# Patient Record
Sex: MALE | URBAN - METROPOLITAN AREA
[De-identification: names, ages, dates, MRNs, and addresses within clinical notes are randomized per-mention and may not be internally consistent; named-entity substitution may affect disease eponyms.]

---

## 2022-12-28 DIAGNOSIS — Z12.11 SCREENING FOR COLON CANCER: Primary | ICD-10-CM

## 2023-05-31 ENCOUNTER — TELEPHONE (OUTPATIENT)
Dept: GASTROENTEROLOGY | Facility: CLINIC | Age: 48
End: 2023-05-31
Payer: COMMERCIAL

## 2023-05-31 VITALS — HEIGHT: 72 IN | WEIGHT: 220 LBS | BODY MASS INDEX: 29.8 KG/M2

## 2023-05-31 DIAGNOSIS — Z12.11 SCREENING FOR COLON CANCER: Primary | ICD-10-CM

## 2023-05-31 RX ORDER — LISINOPRIL 20 MG/1
TABLET ORAL
COMMUNITY
Start: 2023-05-22

## 2023-05-31 NOTE — TELEPHONE ENCOUNTER
Reached out to pt and scheduled screening colon w/ RMM per referral. Updated chart in Epic w/ pt. Reviewed instructions w/ pt who voiced understanding. E-mailed instructions and link to Sutab to [email or web address] per pt request - VL

## 2023-06-01 RX ORDER — SOD SULF/POT CHLORIDE/MAG SULF 1.479 G
12 TABLET ORAL DAILY
Qty: 24 TABLET | Refills: 0 | Status: SHIPPED | OUTPATIENT
Start: 2023-06-01 | End: 2023-07-27 | Stop reason: ALTCHOICE

## 2023-07-10 DIAGNOSIS — Z12.11 SCREENING FOR COLON CANCER: Primary | ICD-10-CM

## 2023-07-10 NOTE — PROGRESS NOTES
[facility]  [address] [place] [address]  Phone: [phone]  Fax: [phone]    History & Physical         Provider: Dr. Phil Sharp    Patient Name: Dangelo MONTANEZ (age):1975  48 y.o.           Gender: male   Phone: [phone]     Referring Physician: Maira Ying     Vital Signs:   Height - 6'  Weight - 220 lbs  BMI -  29.8    Plan: Colonoscopy    Encounter Diagnosis   Name Primary?    Screening for colon cancer Yes           History:      Past Medical History:   Diagnosis Date    BMI 29.0-29.9,adult     Chronic sinusitis, unspecified     Environmental allergies     High blood pressure       Past Surgical History:   Procedure Laterality Date    APPENDECTOMY        Medication List with Changes/Refills   Current Medications    LISINOPRIL (PRINIVIL,ZESTRIL) 20 MG TABLET    TAKE 1 TABLET BY MOUTH DAILY FOR BLOOD PRESSURE thank youmike -)    SOD SULF-POT CHLORIDE-MAG SULF (SUTAB) 1.479-0.188- 0.225 GRAM TABLET    Take 12 tablets by mouth once daily. Take according to package instructions with indicated amount of water. No breakfast day before test. May substitute with Suprep, Clenpiq, Plenvu, Moviprep or GoLytely based on Rx plan and patient preference.      Review of patient's allergies indicates:   Allergen Reactions    Penicillins       Family History   Problem Relation Age of Onset    Lung cancer Father 60    Ulcerative colitis Neg Hx     Pancreatic cancer Neg Hx     Liver disease Neg Hx     Colon cancer Neg Hx     Throat cancer Neg Hx     Esophageal cancer Neg Hx     Crohn's disease Neg Hx     Stomach cancer Neg Hx       Social History     Tobacco Use    Smoking status: Former     Types: Cigarettes    Smokeless tobacco: Former     Types: Snuff   Substance Use Topics    Alcohol use: Never    Drug use: Never        Physical Examination:     General  Appearance:___________________________  HEENT: _____________________________________  Abdomen:____________________________________  Heart:________________________________________  Lungs:_______________________________________  Extremities:___________________________________  Skin:_________________________________________  Endocrine:____________________________________  Genitourinary:_________________________________  Neurological:__________________________________      Patient has been evaluated immediately prior to sedation and is medically cleared for endoscopy with IVCS as an ASA class: ______      Physician Signature: _________________________       Date: ________  Time: ________

## 2023-07-17 ENCOUNTER — TELEPHONE (OUTPATIENT)
Dept: GASTROENTEROLOGY | Facility: CLINIC | Age: 48
End: 2023-07-17
Payer: COMMERCIAL

## 2023-07-17 NOTE — TELEPHONE ENCOUNTER
----- Message from Afia Corrigan sent at 7/17/2023  8:39 AM CDT -----  Contact: Gali/wife  Gali is calling to see if he can get another email with the prescription discount card on it the last one he got he deleted by mistake.    Thanks  Td

## 2023-07-21 ENCOUNTER — OUTSIDE PLACE OF SERVICE (OUTPATIENT)
Dept: GASTROENTEROLOGY | Facility: CLINIC | Age: 48
End: 2023-07-21

## 2023-07-21 LAB — CRC RECOMMENDATION EXT: NORMAL

## 2023-07-21 PROCEDURE — 45385 PR COLONOSCOPY,REMV LESN,SNARE: ICD-10-PCS | Mod: 33,,, | Performed by: INTERNAL MEDICINE

## 2023-07-21 PROCEDURE — 45385 COLONOSCOPY W/LESION REMOVAL: CPT | Mod: 33,,, | Performed by: INTERNAL MEDICINE

## 2023-07-27 ENCOUNTER — TELEPHONE (OUTPATIENT)
Dept: GASTROENTEROLOGY | Facility: CLINIC | Age: 48
End: 2023-07-27

## 2023-07-27 DIAGNOSIS — K62.1 RECTAL POLYP: ICD-10-CM

## 2023-07-27 DIAGNOSIS — D3A.8 NEUROENDOCRINE TUMOR: Primary | ICD-10-CM

## 2023-07-27 NOTE — TELEPHONE ENCOUNTER
----- Message from Subha Nagy sent at 7/27/2023  9:38 AM CDT -----  Contact: willie  [phone]  Type:  Patient Returning Call    Who Called:willie  Who Left Message for Patient:sarabjit  Does the patient know what this is regarding?:appt  Would the patient rather a call back or a response via [facility]? Call back  Best Call Back Number:[phone]    Additional Information: n/a

## 2023-07-27 NOTE — TELEPHONE ENCOUNTER
Wife called and said Dr Sharp tried to call her but she could not talk because she was at work.  She said her  did not understand what you told him and he would like you to talk to her.  He is at work and he can't talk to you.  Please call her at [phone]

## 2023-07-27 NOTE — TELEPHONE ENCOUNTER
results given to patient-set up FLEX SIG next week to tattoo rectal area-prep-clears day before-ducolax 4 po at 4 pm, fleets enema at 8pm and in am one hour prior to procedure      - Spoke to patient and scheduled procedure. Will email prep to patiens wife. -KG

## 2023-07-31 DIAGNOSIS — D3A.8 NEUROENDOCRINE TUMOR: Primary | ICD-10-CM

## 2023-07-31 DIAGNOSIS — K62.1 RECTAL POLYP: ICD-10-CM

## 2023-07-31 NOTE — PROGRESS NOTES
[facility]  [address] [place] [address]  Phone: [phone]  Fax: [phone]    History & Physical         Provider: Dr. Phil Sharp    Patient Name: Dangelo MONTANEZ (age):1975  48 y.o.           Gender: male   Phone: [phone]     Referring Physician: Maira Ying     Vital Signs:   Height - 6'  Weight - 220 lbs  BMI -  29.8    Plan: Flexible Sigmoidoscopy w/ tattoo    Encounter Diagnoses   Name Primary?    Neuroendocrine tumor Yes    Rectal polyp            History:      Past Medical History:   Diagnosis Date    BMI 29.0-29.9,adult     Chronic sinusitis, unspecified     Environmental allergies     High blood pressure     Neuroendocrine tumor     rectal polyp    Personal history of colonic polyps       Past Surgical History:   Procedure Laterality Date    APPENDECTOMY      COLONOSCOPY  2023    rectal polyp - neuroendocrine tumor - G1      Medication List with Changes/Refills   Current Medications    LISINOPRIL (PRINIVIL,ZESTRIL) 20 MG TABLET    TAKE 1 TABLET BY MOUTH DAILY FOR BLOOD PRESSURE thank youmike -)      Review of patient's allergies indicates:   Allergen Reactions    Penicillins       Family History   Problem Relation Age of Onset    Lung cancer Father 60    Ulcerative colitis Neg Hx     Pancreatic cancer Neg Hx     Liver disease Neg Hx     Colon cancer Neg Hx     Throat cancer Neg Hx     Esophageal cancer Neg Hx     Crohn's disease Neg Hx     Stomach cancer Neg Hx       Social History     Tobacco Use    Smoking status: Former     Current packs/day: 0.00     Types: Cigarettes    Smokeless tobacco: Former     Types: Snuff   Substance Use Topics    Alcohol use: Never    Drug use: Never        Physical Examination:     General Appearance:___________________________  HEENT:  _____________________________________  Abdomen:____________________________________  Heart:________________________________________  Lungs:_______________________________________  Extremities:___________________________________  Skin:_________________________________________  Endocrine:____________________________________  Genitourinary:_________________________________  Neurological:__________________________________      Patient has been evaluated immediately prior to sedation and is medically cleared for endoscopy with IVCS as an ASA class: ______      Physician Signature: _________________________       Date: ________  Time: ________

## 2023-08-01 ENCOUNTER — TELEPHONE (OUTPATIENT)
Dept: GASTROENTEROLOGY | Facility: CLINIC | Age: 48
End: 2023-08-01
Payer: COMMERCIAL

## 2023-08-01 NOTE — TELEPHONE ENCOUNTER
----- Message from Anny Lu sent at 8/1/2023  8:55 AM CDT -----  Type:  Needs Medical Advice    Who Called: Dangelo Fernando ( pt's wife, Gali )   Symptoms (please be specific): -   How long has patient had these symptoms:  -  Pharmacy name and phone #:  -  Would the patient rather a call back or a response via [facility]?    Best Call Back Number: [phone] ([facility] ) [phone] ( home)   Additional Information: needs to clarify if pt can have solid food today please call

## 2023-08-01 NOTE — TELEPHONE ENCOUNTER
Spoke to patients wife and let her know he is NOT to have any solids and that his time is not 12 - [facility] will call the patient with arrival time. -KG

## 2023-08-02 ENCOUNTER — TELEPHONE (OUTPATIENT)
Dept: GASTROENTEROLOGY | Facility: CLINIC | Age: 48
End: 2023-08-02

## 2023-08-02 ENCOUNTER — OUTSIDE PLACE OF SERVICE (OUTPATIENT)
Dept: GASTROENTEROLOGY | Facility: CLINIC | Age: 48
End: 2023-08-02

## 2023-08-07 ENCOUNTER — TELEPHONE (OUTPATIENT)
Dept: GASTROENTEROLOGY | Facility: CLINIC | Age: 48
End: 2023-08-07
Payer: COMMERCIAL

## 2023-08-07 NOTE — TELEPHONE ENCOUNTER
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    Results discussed with patient per Dr. Sharp's chart remarks. Patient voices understanding/agreement. Referral was sent 8/2/23. -Katie JONES CMA

## 2023-08-09 ENCOUNTER — TELEPHONE (OUTPATIENT)
Dept: GASTROENTEROLOGY | Facility: CLINIC | Age: 48
End: 2023-08-09
Payer: COMMERCIAL

## 2023-08-09 NOTE — TELEPHONE ENCOUNTER
Spoke to both MALENA and pt. Patient is in system and should get a call either this afternoon or tomorrow to get him scheduled w/ the GI center. GI's number is [phone] opt 1. Fax for any clinicals: [phone]. MALENA MRN: [id number] -- add to facesheet for all clinicals sent. -KG

## 2023-08-09 NOTE — TELEPHONE ENCOUNTER
----- Message from Maryam Dixon sent at 8/9/2023 12:04 PM CDT -----  Patient wife is calling in regards to have not receive a call from MD Guerra..Please call them back at [phone].              Thanks  melody

## 2023-08-11 ENCOUNTER — DOCUMENTATION ONLY (OUTPATIENT)
Dept: GASTROENTEROLOGY | Facility: CLINIC | Age: 48
End: 2023-08-11
Payer: COMMERCIAL

## 2023-08-17 ENCOUNTER — TELEPHONE (OUTPATIENT)
Dept: GASTROENTEROLOGY | Facility: CLINIC | Age: 48
End: 2023-08-17
Payer: COMMERCIAL

## 2023-08-17 NOTE — TELEPHONE ENCOUNTER
----- Message from Karla Mehta sent at 8/17/2023  9:06 AM CDT -----  Contact: Meryl Guerra  Type: Staff Message    Who called: Xantie/MD Anderson  Call back number:  [phone] ext [phone]  Reason for the call: Checking on receipt of continuation of care faxed 8/16/2023  Additional information: Request ID number [id number]

## 2024-03-07 DIAGNOSIS — Z12.11 SCREENING FOR COLON CANCER: Primary | ICD-10-CM

## 2024-04-02 ENCOUNTER — TELEPHONE (OUTPATIENT)
Dept: GASTROENTEROLOGY | Facility: CLINIC | Age: 49
End: 2024-04-02
Payer: COMMERCIAL

## 2024-04-02 NOTE — TELEPHONE ENCOUNTER
Called patient to schedule colonoscopy. He said that he is in [place] working and will have his wife call the office to get him scheduled. DMP

## 2024-04-02 NOTE — TELEPHONE ENCOUNTER
----- Message from Niecy Joseph sent at 4/2/2024  3:22 PM CDT -----  Regarding: Direct Schedule - New Pt NBP/Est Pt RMM - last sigmoidoscopy 08.2023  2nd referral    Please call pt to schedule.    Thanks,  Niecy

## 2024-04-22 ENCOUNTER — TELEPHONE (OUTPATIENT)
Dept: GASTROENTEROLOGY | Facility: CLINIC | Age: 49
End: 2024-04-22
Payer: COMMERCIAL

## 2024-04-22 NOTE — TELEPHONE ENCOUNTER
Called patient to schedule colonoscopy. He asked me to call his wife at [phone] since he is at work.  Called his wife and she didn't answer. Lvm telling her to call the office back. DMP

## 2024-04-22 NOTE — TELEPHONE ENCOUNTER
----- Message from Niecy Joseph sent at 4/22/2024  2:50 PM CDT -----  Regarding: Direct Schedule - New Pt NBP/Est Pt RMM - last sigmoidoscopy 08.2023  Contact: Niecy Fairchild  ----- Message -----  From: Dayami Flanagan MA  Sent: 4/22/2024   2:44 PM CDT  To: Niecy Joseph      ----- Message -----  From: Miriam Jean Baptiste  Sent: 4/22/2024   2:41 PM CDT  To: Juan LEAL Staff    Patient Call Back    Patient Name: Dangelo Fernando   Nature of Call:Appt Scheduling  Call Back Number: [phone]    Additional Information: referral authorized.

## 2024-04-29 ENCOUNTER — TELEPHONE (OUTPATIENT)
Dept: GASTROENTEROLOGY | Facility: CLINIC | Age: 49
End: 2024-04-29

## 2024-04-29 NOTE — TELEPHONE ENCOUNTER
Patient is not due for colonoscopy until 7/21/2028 per gmed. Patient has referral for colonoscopy in work que. Called patient to see if he had any issues and to let him know he is not currently due at this time. 4/29/24 SIRISHAA

## 2024-07-15 ENCOUNTER — TELEPHONE (OUTPATIENT)
Dept: GASTROENTEROLOGY | Facility: CLINIC | Age: 49
End: 2024-07-15
Payer: COMMERCIAL

## 2024-07-15 VITALS — WEIGHT: 220 LBS | HEIGHT: 71 IN | BODY MASS INDEX: 30.8 KG/M2

## 2024-07-15 DIAGNOSIS — Z86.010 HISTORY OF COLON POLYPS: ICD-10-CM

## 2024-07-15 DIAGNOSIS — D3A.8 BENIGN NEUROENDOCRINE NEOPLASM OF RECTUM: Primary | ICD-10-CM

## 2024-07-15 NOTE — TELEPHONE ENCOUNTER
----- Message from Deepika Valiente sent at 7/15/2024  9:19 AM CDT -----  Contact: pt  Pt wife calling for 1 year re-check for colonoscopy  and he can be reached at [phone]     Thanks,

## 2024-07-15 NOTE — TELEPHONE ENCOUNTER
Called patient. He advised he is at work and requested me to call his wife at [phone].     Patient's wife requested that patient be put on the cancellation list. Patient has been added.     Patient is not having any current GI problems or issues. No hx of seizures, sleep apnea, or kidney disease. Chart was reviewed and updated with patient's wife. Prep instructions were also reviewed and sent to patient's email at [email or web address].    Colonoscopy scheduled on Monday December 9, 2024 w/ NBP at [facility]. [facility]

## 2024-07-15 NOTE — TELEPHONE ENCOUNTER
Per MAGDALENA okay to direct schedule patient for colonoscopy. This is per MDA's recommendations. DMP

## 2024-07-19 RX ORDER — SOD SULF/POT CHLORIDE/MAG SULF 1.479 G
TABLET ORAL
Qty: 24 TABLET | Refills: 0 | Status: SHIPPED | OUTPATIENT
Start: 2024-07-19

## 2024-07-19 NOTE — TELEPHONE ENCOUNTER
Low grade-NET of rectal polyp with no residual tumor s/p polypectomy 7/2023  MDA- recommended colonoscopy in 1 year (8/2024).   Order signed.  KN

## 2024-12-02 ENCOUNTER — TELEPHONE (OUTPATIENT)
Dept: GASTROENTEROLOGY | Facility: CLINIC | Age: 49
End: 2024-12-02
Payer: COMMERCIAL

## 2024-12-02 VITALS — HEIGHT: 71 IN | WEIGHT: 220 LBS | BODY MASS INDEX: 30.8 KG/M2

## 2024-12-02 DIAGNOSIS — D3A.8 BENIGN NEUROENDOCRINE NEOPLASM OF RECTUM: Primary | ICD-10-CM

## 2024-12-02 DIAGNOSIS — Z86.0100 HISTORY OF COLON POLYPS: ICD-10-CM

## 2024-12-02 NOTE — TELEPHONE ENCOUNTER
S/w pt and told him that I was calling as a courtesy regarding up coming Colon with NBP on 12/9/24, Monday and wanted to verify that he has his paper prep instructions and meds. Pt stated he has both.  I also mentioned that [facility] will call the day before (FRI) with the arrival time, GI Lab is located on the third floor, and to pre-register anytime this week. aris

## 2024-12-02 NOTE — TELEPHONE ENCOUNTER
"[facility]  [address] [place] [address]  Phone: [phone]  Fax: [phone]    History & Physical         Provider: Dr. Monique Martinez    Patient Name: Dangelo MONTANEZ (age):1975  49 y.o.           Gender: male   Phone: [phone]     Referring Physician: Maira Ying     Vital Signs:   Height - 5' 11"  Weight - 220 lb  BMI -  30.68    Plan: Colonoscopy (h/o NET rectal polyp 2023) @ [facility]    Encounter Diagnoses   Name Primary?    Benign neuroendocrine neoplasm of rectum Yes    History of colon polyps            History:      Past Medical History:   Diagnosis Date    BMI 30.68 07/15/2024    Chronic sinusitis, unspecified     Environmental allergies     High blood pressure     Hx of grade/stage I internal hemorrhoids     Neuroendocrine tumor 2023    low grade-rectal polyp    Personal history of colonic polyps       Past Surgical History:   Procedure Laterality Date    APPENDECTOMY      COLONOSCOPY  2023    rectal polyp - neuroendocrine tumor - G1    SIGMOIDOSCOPY  2023      Medication List with Changes/Refills   Current Medications    LISINOPRIL (PRINIVIL,ZESTRIL) 20 MG TABLET    TAKE 1 TABLET BY MOUTH DAILY FOR BLOOD PRESSURE thank tommie -)    SOD SULF-POT CHLORIDE-MAG SULF (SUTAB) 1.479-0.188- 0.225 GRAM TABLET    Take according to package instructions with indicated amount of water. No breakfast day before test. May substitute with Suprep, Clenpiq, Plenvu, Moviprep or GoLytely based on Rx plan and patient preference.      Review of patient's allergies indicates:   Allergen Reactions    Penicillins       Family History   Problem Relation Name Age of Onset    No Known Problems Mother      Lung cancer Father  60    Ulcerative colitis Neg Hx      Pancreatic cancer Neg Hx      Liver disease Neg Hx      Colon cancer Neg Hx      Throat cancer Neg Hx      Esophageal " cancer Neg Hx      Crohn's disease Neg Hx      Stomach cancer Neg Hx        Social History     Tobacco Use    Smoking status: Former     Types: Cigarettes    Smokeless tobacco: Former     Types: Snuff   Substance Use Topics    Alcohol use: Never    Drug use: Never        Physical Examination:     General Appearance:___________________________  HEENT: _____________________________________  Abdomen:____________________________________  Heart:________________________________________  Lungs:_______________________________________  Extremities:___________________________________  Skin:_________________________________________  Endocrine:____________________________________  Genitourinary:_________________________________  Neurological:__________________________________      Patient has been evaluated immediately prior to sedation and is medically cleared for endoscopy with IVCS as an ASA class: ______      Physician Signature: _________________________       Date: ________  Time: ________

## 2024-12-06 NOTE — TELEPHONE ENCOUNTER
Deepika Valiente Staff  Caller: pt (Today,  9:29 AM)  Pt wife Gali calling for script for sod sulf-pot chloride-mag sulf (SUTAB) 1.479-0.188- 0.225 gram for pt colonoscopy on 12/9/2024 and pt also needs instructions for procedure sent to [email or web address].  Pt can be reached at [phone].      12/6/24 9:39 AM    Returned pt wife call and told her that I sent new prep instructions to the email address she provided and that I will send a message to have the prep meds resent to pharmacy. [facility]    12/6/24 4:07 PM    Rec'd NBP message that [facility] [address] will not be open over the week and to call the pt to get another pharmacy. I called pt's wife and she stated [facility]. I have added pharmacy and notified NBP. [facility]

## 2024-12-07 RX ORDER — SOD SULF/POT CHLORIDE/MAG SULF 1.479 G
TABLET ORAL
Qty: 24 TABLET | Refills: 0 | Status: SHIPPED | OUTPATIENT
Start: 2024-12-07

## 2024-12-09 ENCOUNTER — OUTSIDE PLACE OF SERVICE (OUTPATIENT)
Dept: GASTROENTEROLOGY | Facility: CLINIC | Age: 49
End: 2024-12-09

## 2024-12-09 LAB — CRC RECOMMENDATION EXT: NORMAL

## 2024-12-13 ENCOUNTER — TELEPHONE (OUTPATIENT)
Dept: GASTROENTEROLOGY | Facility: CLINIC | Age: 49
End: 2024-12-13
Payer: COMMERCIAL

## 2024-12-13 NOTE — TELEPHONE ENCOUNTER
1 TA, rectal scar Bx nl, repeat colonoscopy in 3 years.     Notify patient that his colon polyp and colon scar Bx were benign. Repeat colonoscopy in 3 years. Confirm recall tab in appointment desk is up-to-date (update if needed). Send colonoscopy and pathology reports to his [facility] doctor (Dr. Ran Mar, has visit with him 12/17/2024).  NBP

## 2024-12-13 NOTE — TELEPHONE ENCOUNTER
Spoke with patient to inform him of results and recommendation of repeat Colonoscopy in 3 years. Patient verbalized understanding of this information. Recall tab is up to date in patient's chart.    Colonoscopy and Path reports faxed to [facility] Dr. Ran Mar at  [phone]. -YANE,LAURENN

## 2025-01-29 ENCOUNTER — DOCUMENTATION ONLY (OUTPATIENT)
Dept: GASTROENTEROLOGY | Facility: CLINIC | Age: 50
End: 2025-01-29
Payer: COMMERCIAL